# Patient Record
Sex: FEMALE | Race: WHITE
[De-identification: names, ages, dates, MRNs, and addresses within clinical notes are randomized per-mention and may not be internally consistent; named-entity substitution may affect disease eponyms.]

---

## 2018-10-29 ENCOUNTER — HOSPITAL ENCOUNTER (OUTPATIENT)
Dept: HOSPITAL 76 - DI | Age: 58
Discharge: HOME | End: 2018-10-29
Attending: INTERNAL MEDICINE
Payer: COMMERCIAL

## 2018-10-29 DIAGNOSIS — J45.909: ICD-10-CM

## 2018-10-29 DIAGNOSIS — H69.80: ICD-10-CM

## 2018-10-29 DIAGNOSIS — R05: Primary | ICD-10-CM

## 2018-10-29 PROCEDURE — 71046 X-RAY EXAM CHEST 2 VIEWS: CPT

## 2018-10-29 NOTE — XRAY REPORT
Reason:  COUGH,UNSPECIFIED ASTHMA,UNCOMP

Procedure Date:  10/29/2018   

Accession Number:  [id number] / [id number]                    

Procedure:  XR  - Chest 2 View X-Ray CPT Code:  94254

 

FULL RESULT:

 

 

EXAM:

CHEST RADIOGRAPHY

 

EXAM DATE: 10/29/2018 01:30 PM.

 

CLINICAL HISTORY: Cough, unspecified asthma, uncomplicated.

 

COMPARISON: None.

 

TECHNIQUE: 2 views.

 

FINDINGS:

Lungs/Pleura: No focal opacities evident. No pleural effusion. No 

pneumothorax. Normal volumes.

 

Mediastinum: Heart and mediastinal contours are unremarkable.

 

Other: None.

IMPRESSION: Normal 2-view chest radiography.

 

[facility]

## 2021-04-05 ENCOUNTER — HOSPITAL ENCOUNTER (OUTPATIENT)
Dept: HOSPITAL 76 - DI.S | Age: 61
Discharge: HOME | End: 2021-04-05
Attending: NURSE PRACTITIONER
Payer: COMMERCIAL

## 2021-04-05 DIAGNOSIS — M75.91: ICD-10-CM

## 2021-04-05 DIAGNOSIS — M25.511: Primary | ICD-10-CM

## 2021-04-05 NOTE — XRAY REPORT
PROCEDURE:  Shoulder 3 View RT

 

INDICATIONS:  PAIN IN R SHLDR

 

TECHNIQUE:  3 views of the shoulder were acquired.  

 

COMPARISON:  None.

 

FINDINGS:  

 

Bones:  No fractures or dislocations. Mild undersurface spurring at the acromioclavicular joint.  No 
suspicious bony lesions.  Visualized ribs appear intact.  

 

Soft tissues:  No suspicious soft tissue calcifications.  

 

IMPRESSION:  Mild undersurface spurring at the acromioclavicular joint. Otherwise normal shoulder.

 

Reviewed by: Daniela Aaron MD on 4/5/2021 9:14 AM SONYA

Approved by: Daniela Aaron MD on 4/5/2021 9:14 AM SONYA

 

 

Station ID:  [id number]

## 2021-04-28 ENCOUNTER — HOSPITAL ENCOUNTER (OUTPATIENT)
Dept: HOSPITAL 76 - DI | Age: 61
Discharge: HOME | End: 2021-04-28
Attending: NURSE PRACTITIONER
Payer: COMMERCIAL

## 2021-04-28 DIAGNOSIS — M85.89: Primary | ICD-10-CM

## 2021-04-28 DIAGNOSIS — Z12.31: Primary | ICD-10-CM

## 2021-04-28 NOTE — DEXA REPORT
PROCEDURE: Dexa Spine and/or Hip

 

INDICATIONS: POST MENOPAUSAL

 

TECHNIQUE: Dual energy x-ray absorptiometry (DXA) was performed on a i[employer] System.  Regions measur
ed are the AP Spine, femoral neck, and if needed forearm.

 

COMPARISON: None.

  

FINDINGS:

 

Lumbar Spine: 

Bone Mineral Density   1.080 g/cm/cm,T score  -0.8, normal

 

Left Hip:

Bone Mineral Density  0.726 g/cm/cm,T score -2.2, osteopenia

 

Left Femoral Neck:

Bone Mineral Density  0.735 g/cm/cm, T score 2.2, osteopenia

 

 

(T score greater or equal to -1.0: NORMAL)

(T score from -1.1 to -2.4: OSTEOPENIA)

(T score less than or equal to -2.5 to: OSTEOPOROSIS)

 

Impression: Normal bone mineral density of the lumbosacral spine overall but there is osteopenia at t
he left hip and left femoral neck.

 

Patients with diagnosis of osteoporosis or osteopenia should have regular bone mineral density assess
ment.  For those eligible for Medicare, routine testing is allowed once every 2 years.  Testing frequ
ency can be increased for patients who have rapidly progressing disease or for those who are receivin
g medical therapy to restore bone mass.

 

Reviewed by: Mac Lombardo MD on 4/28/2021 4:35 PM PDT

Approved by: Mac Lombardo MD on 4/28/2021 4:35 PM PDT

 

 

Station ID:  [id number]

## 2021-04-29 NOTE — MAMMOGRAPHY REPORT
BILATERAL DIGITAL SCREENING MAMMOGRAM 3D/2D: 4/28/2021

 

CLINICAL: Routine screening.  

 

No prior exams were available for comparison.  There are scattered fibroglandular elements in both br
easts.  

 

There are benign post operative findings in both breasts.  

 

No significant masses, calcifications, or other findings are seen in either breast.  

 

IMPRESSION: BENIGN

There is no mammographic evidence of malignancy. A 1 year screening mammogram is recommended.  

 

 

This exam was interpreted at Station ID: [id number].  

 

NOTE: For mammograms, a report in lay terms will be sent to the patient. Approximately 15% of breast 
malignancies will not be visualized mammographically. In the management of a palpable breast mass, a 
negative mammogram must not discourage biopsy of a clinically suspicious lesion.

 

Electronically Signed By: Armaan sanders/claire:4/28/2021 18:01:03  

 

 

 

ACR BI-RADS Category 2: Benign Finding(s) 3342F

PARENCHYMAL PATTERN: (A) - The breast(s) demonstrate(s) scattered fibroglandular densities.

BI-RADS CATEGORY: (2) - 2

RECOMMENDATION: (ANNUAL)  - Recommend routine annual screening mammography.

[id number]

1 year screening

LATERALITY: (B)